# Patient Record
Sex: FEMALE | HISPANIC OR LATINO | Employment: OTHER | URBAN - METROPOLITAN AREA
[De-identification: names, ages, dates, MRNs, and addresses within clinical notes are randomized per-mention and may not be internally consistent; named-entity substitution may affect disease eponyms.]

---

## 2023-09-01 ENCOUNTER — OFFICE VISIT (OUTPATIENT)
Dept: OBSTETRICS AND GYNECOLOGY | Facility: CLINIC | Age: 23
End: 2023-09-01

## 2023-09-01 VITALS
BODY MASS INDEX: 29.99 KG/M2 | DIASTOLIC BLOOD PRESSURE: 79 MMHG | SYSTOLIC BLOOD PRESSURE: 128 MMHG | HEIGHT: 66 IN | WEIGHT: 186.6 LBS

## 2023-09-01 DIAGNOSIS — Z30.432 ENCOUNTER FOR IUD REMOVAL: Primary | ICD-10-CM

## 2023-09-01 NOTE — PROGRESS NOTES
CC:Here for IUD removal. She declines other contraceptive options at this time. She c/o weight gain and breast tenderness with IUD. She has mirena IUD that was placed in 2021. This is my first time meeting Karmen Diego. She has seen Dr Munoz in the last, last seen in 2018    Type of IUD:  Mirena  Date of insertion:  2021  Reason for removal:  side effectes  Other relevant history/information:  none    Procedure Time Out Documentation      Procedure Details  IUD strings visible:  yes  Local anesthesia:  None  Tenaculum used:  None  Removal:  IUD strings grasped and IUD removed intact with gentle traction.  The patient tolerated the procedure well.    All appropriate instructions regarding removal were reviewed.    Tolerated well  No apparent complications  Post procedure diagnosis : IUD removal     Plans for contraception:  no method    Other follow-up needed:  none    The patient was advised to call for any fever or for prolonged or severe pain or bleeding. She was advised to use NSAID as needed for mild to moderate pain.     Diagnoses and all orders for this visit:    1. Encounter for IUD removal (Primary)      Due for AE, encouraged to schedule  Declines other contraceptive options at this time    Jess Xiong, APRN  9/1/2023  12:20 EDT